# Patient Record
Sex: FEMALE | Race: WHITE | Employment: FULL TIME | URBAN - METROPOLITAN AREA
[De-identification: names, ages, dates, MRNs, and addresses within clinical notes are randomized per-mention and may not be internally consistent; named-entity substitution may affect disease eponyms.]

---

## 2017-02-20 ENCOUNTER — HOSPITAL ENCOUNTER (EMERGENCY)
Facility: CLINIC | Age: 52
Discharge: HOME | End: 2017-02-20
Attending: EMERGENCY MEDICINE | Admitting: EMERGENCY MEDICINE
Payer: COMMERCIAL

## 2017-02-20 VITALS
OXYGEN SATURATION: 95 % | DIASTOLIC BLOOD PRESSURE: 95 MMHG | TEMPERATURE: 98.3 F | RESPIRATION RATE: 16 BRPM | HEART RATE: 76 BPM | SYSTOLIC BLOOD PRESSURE: 147 MMHG

## 2017-02-20 DIAGNOSIS — M25.552 HIP PAIN, LEFT: ICD-10-CM
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
## 2017-02-20 PROCEDURE — 99283 EMERGENCY DEPT VISIT LOW MDM: CPT

## 2017-02-20 PROCEDURE — 25000132 ZZH RX MED GY IP 250 OP 250 PS 637: Performed by: EMERGENCY MEDICINE

## 2017-02-20 RX ORDER — IBUPROFEN 600 MG/1
600 TABLET, FILM COATED ORAL ONCE
Status: COMPLETED | OUTPATIENT
Start: 2017-02-20 | End: 2017-02-20

## 2017-02-20 RX ADMIN — IBUPROFEN 600 MG: 600 TABLET ORAL at 09:00

## 2017-02-20 ASSESSMENT — ENCOUNTER SYMPTOMS
ABDOMINAL PAIN: 0
NECK PAIN: 1
BACK PAIN: 1
SHORTNESS OF BREATH: 0
VOMITING: 0
NAUSEA: 0
NUMBNESS: 1
WEAKNESS: 0
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
## 2017-02-20 NOTE — ED NOTES
Bed: ED14  Expected date:   Expected time:   Means of arrival:   [place]  [facility] [place] 51[place] eta 9629

## 2017-02-20 NOTE — ED AVS SNAPSHOT
Emergency Department    [address] [place] [id number]    Phone:  [phone]    Fax:  [phone]                                       Iron Goetz   MRN: [id number]    Department:   Emergency Department   Date of Visit:  2/20/2017           After Visit Summary Signature Page     I have received my discharge instructions, and my questions have been answered. I have discussed any challenges I see with this plan with the nurse or doctor.    ..........................................................................................................................................  Patient/Patient Representative Signature      ..........................................................................................................................................  Patient Representative Print Name and Relationship to Patient    ..................................................               ................................................  Date                                            Time    ..........................................................................................................................................  Reviewed by Signature/Title    ...................................................              ..............................................  Date                                                            Time

## 2020-08-18 ENCOUNTER — APPOINTMENT (OUTPATIENT)
Dept: GENERAL RADIOLOGY | Facility: CLINIC | Age: 55
End: 2020-08-18
Attending: EMERGENCY MEDICINE
Payer: COMMERCIAL

## 2020-08-18 ENCOUNTER — HOSPITAL ENCOUNTER (EMERGENCY)
Facility: CLINIC | Age: 55
Discharge: HOME | End: 2020-08-18
Attending: EMERGENCY MEDICINE | Admitting: EMERGENCY MEDICINE
Payer: COMMERCIAL

## 2020-08-18 VITALS
SYSTOLIC BLOOD PRESSURE: 121 MMHG | BODY MASS INDEX: 34.53 KG/M2 | RESPIRATION RATE: 18 BRPM | HEART RATE: 61 BPM | DIASTOLIC BLOOD PRESSURE: 74 MMHG | HEIGHT: 67 IN | OXYGEN SATURATION: 97 % | WEIGHT: 220 LBS | TEMPERATURE: 97.7 F

## 2020-08-18 DIAGNOSIS — M54.50 ACUTE BILATERAL LOW BACK PAIN WITHOUT SCIATICA: ICD-10-CM

## 2020-08-18 PROCEDURE — 72202 X-RAY EXAM SI JOINTS 3/> VWS: CPT

## 2020-08-18 PROCEDURE — 99284 EMERGENCY DEPT VISIT MOD MDM: CPT

## 2020-08-18 PROCEDURE — 72220 X-RAY EXAM SACRUM TAILBONE: CPT

## 2020-08-18 PROCEDURE — 25000132 ZZH RX MED GY IP 250 OP 250 PS 637: Performed by: EMERGENCY MEDICINE

## 2020-08-18 RX ORDER — LIDOCAINE 4 G/G
1 PATCH TOPICAL ONCE
Status: DISCONTINUED | OUTPATIENT
Start: 2020-08-18 | End: 2020-08-19 | Stop reason: HOSPADM

## 2020-08-18 RX ORDER — OXYCODONE HYDROCHLORIDE 5 MG/1
5 TABLET ORAL EVERY 6 HOURS PRN
Qty: 8 TABLET | Refills: 0 | Status: SHIPPED | OUTPATIENT
Start: 2020-08-18

## 2020-08-18 RX ORDER — OXYCODONE HYDROCHLORIDE 5 MG/1
5 TABLET ORAL ONCE
Status: COMPLETED | OUTPATIENT
Start: 2020-08-18 | End: 2020-08-18

## 2020-08-18 RX ORDER — LIDOCAINE 50 MG/G
1 PATCH TOPICAL EVERY 24 HOURS
Qty: 10 PATCH | Refills: 0 | Status: SHIPPED | OUTPATIENT
Start: 2020-08-18 | End: 2020-08-28

## 2020-08-18 RX ORDER — ACETAMINOPHEN 325 MG/1
975 TABLET ORAL ONCE
Status: COMPLETED | OUTPATIENT
Start: 2020-08-18 | End: 2020-08-18

## 2020-08-18 RX ADMIN — ACETAMINOPHEN 975 MG: 325 TABLET, FILM COATED ORAL at 21:04

## 2020-08-18 RX ADMIN — OXYCODONE HYDROCHLORIDE 5 MG: 5 TABLET ORAL at 21:04

## 2020-08-18 RX ADMIN — LIDOCAINE 1 PATCH: 560 PATCH PERCUTANEOUS; TOPICAL; TRANSDERMAL at 21:04

## 2020-08-18 ASSESSMENT — MIFFLIN-ST. JEOR: SCORE: 1630.54

## 2020-08-18 NOTE — ED TRIAGE NOTES
Lower back pain after bending over and twisting wrong way yesterday. Today pain is getting worse. Tried Ibuprofen and Tizanidine at home without relief.

## 2020-08-18 NOTE — ED AVS SNAPSHOT
Emergency Department  [address] [place] [address]  Phone:  [phone]  Fax:  [phone]                                    Iron Goetz   MRN: [id number]    Department:   Emergency Department   Date of Visit:  8/18/2020           After Visit Summary Signature Page    I have received my discharge instructions, and my questions have been answered. I have discussed any challenges I see with this plan with the nurse or doctor.    ..........................................................................................................................................  Patient/Patient Representative Signature      ..........................................................................................................................................  Patient Representative Print Name and Relationship to Patient    ..................................................               ................................................  Date                                   Time    ..........................................................................................................................................  Reviewed by Signature/Title    ...................................................              ..............................................  Date                                               Time          22EPIC Rev 08/18

## 2020-08-19 NOTE — ED PROVIDER NOTES
"  History   Chief Complaint:  Back Pain    HPI  Iron Goetz is a 55 year old female, not anticoagulated, with a history of arthritis who presents with lower back pain. She reports the sudden onset of lower back pain 2 days ago when she bent down to  her dog's poop and twisted her back as she leisa. She states the pain has worsened today, and tried Tizanidine and Advil with no relief. She denies chest pain or shortness of breath. She denies fever, weakness, numbness, incontinence, or dysuria. No street drug use. 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     Allergies:  Iodine  Lisinopril    Medications:    Synthroid  Cozaar  Hydrochlorothiazide     Past Medical History:    Arthritis  Hypothyroidism  Anemia  Hypertension    Past Surgical History:      Dilate cervix, hysteroscopy, ablate endometrium  Tonsillectomy  [place] teeth removal     Family History:    Cancer  Hypertension  Thyroid disease    Social History:  Marital Status: Single  Presents with boyfriend at bedside  Tobacco use: Former smoker  Alcohol use: Yes  Drug use: No    Review of Systems   All other systems reviewed and are negative.    Physical Exam     Patient Vitals for the past 24 hrs:   BP Temp Temp src Pulse Resp SpO2 Height Weight   20 2200 121/74 -- -- -- -- -- -- --   20 211 (!) 107/91 -- -- 61 -- 97 % -- --   20 (!) 126/91 -- -- 65 -- 96 % -- --   20 195 (!) 126/91 -- -- -- -- 97 % -- --   20 1852 139/78 97.7  F (36.5  C) Oral 68 18 97 % 1.702 m (5' 7\") 99.8 kg (220 lb)       Physical Exam  General: sitting in a chair appears uncomfortable   Head: No obvious trauma to head.  Ears, Nose, Throat:  External ears normal.  Nose normal.   Eyes:  Conjunctivae clear.    Neck: Normal range of motion.  Neck supple.   CV: Regular rate and rhythm.  No murmurs.      Respiratory: Effort normal and breath sounds normal.  No wheezing or crackles.   Gastrointestinal: Soft.  No distension. There is no " tenderness.    Musculoskeletal: Normal range of motion.  Non tender extremities to palpations.  Tender to palpation of the sacrum and right SI joint.    Neuro: Alert. Moving all extremities appropriately.  Normal speech.  CN II-XII grossly intact.  Gross muscle strength intact of the proximal and distal bilateral lower extremities.  Sensation intact to light touch in bilateral extremities.  2+ patellar reflexes.  Normal gait.  No saddle anesthesia.    Skin: Skin is warm and dry.  No rash noted.     Emergency Department Course     Imaging:  Radiology findings were communicated with the patient who voiced understanding of the findings.    XR Sacrum and Coccyx 2 Views:  Normal joint spaces and alignment. No fracture, as per radiology.     XR Sacroiliac Joint G/E 3 Views:  Normal joint spaces. No erosive change. No fracture, as per radiology.     Interventions:  2104: Tylenol, 975 mg, Oral  2104: Roxicodone, 5 mg, Oral  2104: Lidocaine 4% patch applied    Emergency Department Course:  Past medical records, nursing notes, and vitals reviewed.    2010: I performed an exam of the patient as documented above.     The patient was sent for a XR while in the emergency department, results above.     2315: I rechecked the patient and discussed the results of her workup thus far.     Findings and plan explained to the Patient. Patient discharged home with instructions regarding supportive care, medications, and reasons to return. The importance of close follow-up was reviewed. The patient was prescribed Lidoderm patches and Roxicodone.    I personally reviewed the imaging results with the Patient and answered all related questions prior to discharge.     Impression & Plan     Medical Decision Making:  Iron Goetz is a 55 year old female who presents with back pain.  Pain has improved with interventions in the emergency department.  I did obtain X-rays given the midline pain over the sacrum; fortunately, these were negative  for acute fracture or other abnormality.  Normal SI joint.  No signs of degenerative changes.  The patient has not had a fever, saddle/perineal anesthesia, bilateral foot numbness, or bowel or bladder dysfunction.  She has no red flags in history of cancer or IVDU. There is no clinical evidence of cauda equina syndrome, discitis, spinal/epidural space hematoma or epidural abscess. The neurological exam is normal and the patient's symptoms are consistent with a musculoskeletal (myofascial) strain.  Patient had already tried tizanidine at home without improvement try and narcotic care.  I stressed the importance of using Tylenol, ibuprofen, lidocaine patch before requiring oxycodone.  The patient will be discharged with pain medications to use as directed.  Ice or heat to the back and stretching exercises.  No heavy lifting, bending or twisting. Return if increasing pain, numbness, weakness, or bowel or bladder dysfunction.  The patient was advised to schedule follow-up with her primary doctor and or [facility] (where has seen ortho for other injuries) within 3 days to re-assess symptoms.      Diagnosis:    ICD-10-CM    1. Acute bilateral low back pain without sciatica  M54.5        Disposition:  Discharged to home.    Discharge Medications:  New Prescriptions    LIDOCAINE (LIDODERM) 5 % PATCH    Place 1 patch onto the skin every 24 hours for 10 days    OXYCODONE (ROXICODONE) 5 MG TABLET    Take 1 tablet (5 mg) by mouth every 6 hours as needed for pain       Scribe Disclosure:  IAnnika, am serving as a scribe at 8:08 PM on 8/18/2020 to document services personally performed by Denisse Rosa MD based on my observations and the provider's statements to me.   ISusie, am serving as a scribe on 8/18/2020 at 9:39 PM to personally document services performed by Denisse Rosa MD based on my observations and the provider's statements to me.         Denisse Rosa MD  08/19/20 8411

## 2020-08-19 NOTE — DISCHARGE INSTRUCTIONS
Please see your PCP in 2-3 days for a recheck.  If you have increasing pain, loss of bowel or bladder function, numbness in your groin, unable to walk, fevers >101 or other acute changes, return to the ED.      You may use oxycodone as needed for breakthrough pain.  Otherwise use Tylenol and ibuprofen for pain control.  Lidocaine patch to be applied 12 hours on 12 hours off.  Follow-up with [facility] to discuss with a back specialist if had continued pain.    Discharge Instructions  Back Pain  You were seen today for back pain. Back pain can have many causes, but most will get better without surgery or other specific treatment. Sometimes there is a herniated ( slipped ) disc. We do not usually do MRI scans to look for these right away, since most herniated discs will get better on their own with time.  Today, we did not find any evidence that your back pain was caused by a serious condition. However, sometimes symptoms develop over time and cannot be found during an emergency visit, so it is very important that you follow up with your primary provider.  Generally, every Emergency Department visit should have a follow-up clinic visit with either a primary or a specialty clinic/provider. Please follow-up as instructed by your emergency provider today.    Return to the Emergency Department if:  You develop a fever with your back pain.   You have weakness or change in sensation in one or both legs.  You lose control of your bowels or bladder, or cannot empty your bladder (cannot pee).  Your pain gets much worse.     Follow-up with your provider:  Unless your pain has completely gone away, please make an appointment with your provider within one week. Most of the routine care for back pain is available in a clinic and not the Emergency Department. You may need further management of your back pain, such as more pain medication, imaging such as an X-ray or MRI, or physical therapy.    What can I do to help myself?  Remain  Active -- People are often afraid that they will hurt their back further or delay recovery by remaining active, but this is one of the best things you can do for your back. In fact, staying in bed for a long time to rest is not recommended. Studies have shown that people with low back pain recover faster when they remain active. Movement helps to bring blood flow to the muscles and relieve muscle spasms as well as preventing loss of muscle strength.  Heat -- Using a heating pad can help with low back pain during the first few weeks. Do not sleep with a heating pad, as you can be burned.   Pain medications - You may take a pain medication such as Tylenol  (acetaminophen), Advil , Motrin  (ibuprofen) or Aleve  (naproxen).  If you were given a prescription for medicine here today, be sure to read all of the information (including the package insert) that comes with your prescription.  This will include important information about the medicine, its side effects, and any warnings that you need to know about.  The pharmacist who fills the prescription can provide more information and answer questions you may have about the medicine.  If you have questions or concerns that the pharmacist cannot address, please call or return to the Emergency Department.   Remember that you can always come back to the Emergency Department if you are not able to see your regular provider in the amount of time listed above, if you get any new symptoms, or if there is anything that worries you.